# Patient Record
Sex: FEMALE | Race: BLACK OR AFRICAN AMERICAN | NOT HISPANIC OR LATINO | Employment: OTHER | URBAN - METROPOLITAN AREA
[De-identification: names, ages, dates, MRNs, and addresses within clinical notes are randomized per-mention and may not be internally consistent; named-entity substitution may affect disease eponyms.]

---

## 2021-03-11 ENCOUNTER — IMMUNIZATION (OUTPATIENT)
Dept: NURSING | Facility: CLINIC | Age: 46
End: 2021-03-11
Payer: COMMERCIAL

## 2021-03-11 PROCEDURE — 0001A PR COVID VAC PFIZER DIL RECON 30 MCG/0.3 ML IM: CPT

## 2021-03-11 PROCEDURE — 91300 PR COVID VAC PFIZER DIL RECON 30 MCG/0.3 ML IM: CPT

## 2021-03-21 ENCOUNTER — HEALTH MAINTENANCE LETTER (OUTPATIENT)
Age: 46
End: 2021-03-21

## 2021-04-01 ENCOUNTER — IMMUNIZATION (OUTPATIENT)
Dept: NURSING | Facility: CLINIC | Age: 46
End: 2021-04-01
Attending: FAMILY MEDICINE
Payer: COMMERCIAL

## 2021-04-01 PROCEDURE — 0002A PR COVID VAC PFIZER DIL RECON 30 MCG/0.3 ML IM: CPT

## 2021-04-01 PROCEDURE — 91300 PR COVID VAC PFIZER DIL RECON 30 MCG/0.3 ML IM: CPT

## 2021-09-04 ENCOUNTER — HEALTH MAINTENANCE LETTER (OUTPATIENT)
Age: 46
End: 2021-09-04

## 2021-12-10 ENCOUNTER — IMMUNIZATION (OUTPATIENT)
Dept: NURSING | Facility: CLINIC | Age: 46
End: 2021-12-10
Payer: COMMERCIAL

## 2021-12-10 PROCEDURE — 91300 PR COVID VAC PFIZER DIL RECON 30 MCG/0.3 ML IM: CPT

## 2021-12-10 PROCEDURE — 0004A PR COVID VAC PFIZER DIL RECON 30 MCG/0.3 ML IM: CPT

## 2022-04-16 ENCOUNTER — HEALTH MAINTENANCE LETTER (OUTPATIENT)
Age: 47
End: 2022-04-16

## 2022-05-24 ENCOUNTER — E-VISIT (OUTPATIENT)
Dept: URGENT CARE | Facility: CLINIC | Age: 47
End: 2022-05-24
Payer: COMMERCIAL

## 2022-05-24 DIAGNOSIS — R06.02 SOB (SHORTNESS OF BREATH): Primary | ICD-10-CM

## 2022-05-24 PROCEDURE — 99207 PR NON-BILLABLE SERV PER CHARTING: CPT | Performed by: PHYSICIAN ASSISTANT

## 2022-05-25 NOTE — PATIENT INSTRUCTIONS
Dear Esperanza Hernandez,    We are sorry you are not feeling well. Based on the responses you provided, it is recommended that you be seen in-person in urgent care so we can better evaluate your symptoms. Please click here to find the nearest urgent care location to you.   You will not be charged for this Visit. Thank you for trusting us with your care.    Hoang Pradhan PA-C

## 2022-10-22 ENCOUNTER — HEALTH MAINTENANCE LETTER (OUTPATIENT)
Age: 47
End: 2022-10-22

## 2023-06-01 ENCOUNTER — HEALTH MAINTENANCE LETTER (OUTPATIENT)
Age: 48
End: 2023-06-01

## 2024-06-02 ENCOUNTER — HEALTH MAINTENANCE LETTER (OUTPATIENT)
Age: 49
End: 2024-06-02

## 2024-12-20 ENCOUNTER — TRANSFERRED RECORDS (OUTPATIENT)
Dept: MULTI SPECIALTY CLINIC | Facility: CLINIC | Age: 49
End: 2024-12-20

## 2024-12-20 LAB
CHOLESTEROL (EXTERNAL): 149 MG/DL (ref 50–199)
HDLC SERPL-MCNC: 48 MG/DL
LDL CHOLESTEROL CALCULATED (EXTERNAL): 90 MG/DL
TRIGLYCERIDES (EXTERNAL): 55 MG/DL (ref 10–150)

## 2025-01-17 ENCOUNTER — TRANSFERRED RECORDS (OUTPATIENT)
Dept: MULTI SPECIALTY CLINIC | Facility: CLINIC | Age: 50
End: 2025-01-17

## 2025-01-17 LAB — RETINOPATHY: NORMAL

## 2025-04-07 PROBLEM — R10.13 EPIGASTRIC PAIN: Status: RESOLVED | Noted: 2018-09-04 | Resolved: 2025-04-07

## 2025-04-07 PROBLEM — I10 ESSENTIAL HYPERTENSION: Status: ACTIVE | Noted: 2021-02-24

## 2025-04-07 PROBLEM — E55.9 VITAMIN D DEFICIENCY: Status: ACTIVE | Noted: 2021-02-24

## 2025-04-07 PROBLEM — G47.30 MILD SLEEP APNEA: Status: ACTIVE | Noted: 2021-02-24

## 2025-04-07 PROBLEM — Z78.9 USE OF CONDOMS FOR CONTRACEPTION: Status: RESOLVED | Noted: 2021-03-05 | Resolved: 2025-04-07

## 2025-04-07 PROBLEM — E66.01 MORBID OBESITY (H): Status: RESOLVED | Noted: 2018-09-04 | Resolved: 2025-04-07

## 2025-04-07 PROBLEM — E11.9 DIABETES MELLITUS, TYPE II (H): Status: ACTIVE | Noted: 2018-09-04

## 2025-04-07 RX ORDER — LISINOPRIL 5 MG/1
5 TABLET ORAL
COMMUNITY
Start: 2021-11-11 | End: 2025-04-08 | Stop reason: ALTCHOICE

## 2025-04-08 ENCOUNTER — OFFICE VISIT (OUTPATIENT)
Dept: INTERNAL MEDICINE | Facility: CLINIC | Age: 50
End: 2025-04-08
Payer: COMMERCIAL

## 2025-04-08 ENCOUNTER — ORDERS ONLY (AUTO-RELEASED) (OUTPATIENT)
Dept: INTERNAL MEDICINE | Facility: CLINIC | Age: 50
End: 2025-04-08

## 2025-04-08 VITALS
BODY MASS INDEX: 44.47 KG/M2 | SYSTOLIC BLOOD PRESSURE: 128 MMHG | WEIGHT: 251 LBS | OXYGEN SATURATION: 99 % | TEMPERATURE: 97.3 F | DIASTOLIC BLOOD PRESSURE: 82 MMHG | HEIGHT: 63 IN | HEART RATE: 73 BPM | RESPIRATION RATE: 20 BRPM

## 2025-04-08 DIAGNOSIS — N92.0 MENORRHAGIA WITH REGULAR CYCLE: ICD-10-CM

## 2025-04-08 DIAGNOSIS — Z12.11 SCREEN FOR COLON CANCER: ICD-10-CM

## 2025-04-08 DIAGNOSIS — Z86.2 HISTORY OF ANEMIA: ICD-10-CM

## 2025-04-08 DIAGNOSIS — E04.9 ENLARGED THYROID: ICD-10-CM

## 2025-04-08 DIAGNOSIS — Z87.19 HISTORY OF CHOLELITHIASIS: ICD-10-CM

## 2025-04-08 DIAGNOSIS — Z12.31 VISIT FOR SCREENING MAMMOGRAM: ICD-10-CM

## 2025-04-08 DIAGNOSIS — I10 ESSENTIAL HYPERTENSION: ICD-10-CM

## 2025-04-08 DIAGNOSIS — Z13.220 SCREENING CHOLESTEROL LEVEL: ICD-10-CM

## 2025-04-08 DIAGNOSIS — R10.11 RUQ ABDOMINAL PAIN: ICD-10-CM

## 2025-04-08 DIAGNOSIS — R10.13 EPIGASTRIC PAIN: ICD-10-CM

## 2025-04-08 DIAGNOSIS — Z12.4 CERVICAL CANCER SCREENING: ICD-10-CM

## 2025-04-08 DIAGNOSIS — Z86.19 HISTORY OF HELICOBACTER PYLORI INFECTION: ICD-10-CM

## 2025-04-08 DIAGNOSIS — Z00.00 ANNUAL PHYSICAL EXAM: Primary | ICD-10-CM

## 2025-04-08 DIAGNOSIS — Z11.59 NEED FOR HEPATITIS C SCREENING TEST: ICD-10-CM

## 2025-04-08 DIAGNOSIS — E55.9 VITAMIN D DEFICIENCY: ICD-10-CM

## 2025-04-08 DIAGNOSIS — E11.9 TYPE 2 DIABETES MELLITUS WITHOUT COMPLICATION, WITHOUT LONG-TERM CURRENT USE OF INSULIN (H): ICD-10-CM

## 2025-04-08 PROBLEM — E11.69 TYPE 2 DIABETES MELLITUS WITH OTHER SPECIFIED COMPLICATION, WITHOUT LONG-TERM CURRENT USE OF INSULIN (H): Status: ACTIVE | Noted: 2018-09-04

## 2025-04-08 PROBLEM — D50.0 IRON DEFICIENCY ANEMIA DUE TO CHRONIC BLOOD LOSS: Status: ACTIVE | Noted: 2022-03-03

## 2025-04-08 PROCEDURE — 90656 IIV3 VACC NO PRSV 0.5 ML IM: CPT | Performed by: NURSE PRACTITIONER

## 2025-04-08 PROCEDURE — 90677 PCV20 VACCINE IM: CPT | Performed by: NURSE PRACTITIONER

## 2025-04-08 PROCEDURE — 90746 HEPB VACCINE 3 DOSE ADULT IM: CPT | Performed by: NURSE PRACTITIONER

## 2025-04-08 PROCEDURE — 90715 TDAP VACCINE 7 YRS/> IM: CPT | Performed by: NURSE PRACTITIONER

## 2025-04-08 PROCEDURE — 99214 OFFICE O/P EST MOD 30 MIN: CPT | Mod: 25 | Performed by: NURSE PRACTITIONER

## 2025-04-08 PROCEDURE — 3074F SYST BP LT 130 MM HG: CPT | Performed by: NURSE PRACTITIONER

## 2025-04-08 PROCEDURE — 90471 IMMUNIZATION ADMIN: CPT | Performed by: NURSE PRACTITIONER

## 2025-04-08 PROCEDURE — 99386 PREV VISIT NEW AGE 40-64: CPT | Mod: 25 | Performed by: NURSE PRACTITIONER

## 2025-04-08 PROCEDURE — G2211 COMPLEX E/M VISIT ADD ON: HCPCS | Performed by: NURSE PRACTITIONER

## 2025-04-08 PROCEDURE — 90472 IMMUNIZATION ADMIN EACH ADD: CPT | Performed by: NURSE PRACTITIONER

## 2025-04-08 PROCEDURE — 3079F DIAST BP 80-89 MM HG: CPT | Performed by: NURSE PRACTITIONER

## 2025-04-08 RX ORDER — FUROSEMIDE 20 MG/1
1 TABLET ORAL DAILY
COMMUNITY
End: 2025-04-08

## 2025-04-08 RX ORDER — ACYCLOVIR 400 MG/1
TABLET ORAL
COMMUNITY
Start: 2025-03-04

## 2025-04-08 RX ORDER — LOSARTAN POTASSIUM 50 MG/1
50 TABLET ORAL
COMMUNITY
End: 2025-04-08

## 2025-04-08 RX ORDER — LOSARTAN POTASSIUM 25 MG/1
TABLET ORAL
COMMUNITY
Start: 2025-04-05

## 2025-04-08 RX ORDER — LOSARTAN POTASSIUM 50 MG/1
50 TABLET ORAL DAILY
Qty: 90 TABLET | Refills: 1 | Status: SHIPPED | OUTPATIENT
Start: 2025-04-08

## 2025-04-08 RX ORDER — TIRZEPATIDE 5 MG/.5ML
INJECTION, SOLUTION SUBCUTANEOUS
COMMUNITY

## 2025-04-08 RX ORDER — COPPER 313.4 MG/1
1 INTRAUTERINE DEVICE INTRAUTERINE ONCE
COMMUNITY

## 2025-04-08 SDOH — HEALTH STABILITY: PHYSICAL HEALTH: ON AVERAGE, HOW MANY MINUTES DO YOU ENGAGE IN EXERCISE AT THIS LEVEL?: 20 MIN

## 2025-04-08 SDOH — HEALTH STABILITY: PHYSICAL HEALTH: ON AVERAGE, HOW MANY DAYS PER WEEK DO YOU ENGAGE IN MODERATE TO STRENUOUS EXERCISE (LIKE A BRISK WALK)?: 2 DAYS

## 2025-04-08 ASSESSMENT — SOCIAL DETERMINANTS OF HEALTH (SDOH): HOW OFTEN DO YOU GET TOGETHER WITH FRIENDS OR RELATIVES?: TWICE A WEEK

## 2025-04-08 NOTE — PROGRESS NOTES
{PROVIDER CHARTING PREFERENCE:097170}    Subjective   Esperanza Hernandez is a 49 year old, presenting for the following health issues:  No chief complaint on file.      Via the Health Maintenance questionnaire, the patient has reported the following services have been completed -Eye Exam: [facility] 2025-[facility], this information has been sent to the abstraction team.  HPI        {SUPERLIST (Optional):698356}  {additonal problems for provider to add (Optional):363671}    {ROS Picklists (Optional):382573}      Objective    There were no vitals taken for this visit.  There is no height or weight on file to calculate BMI.  Physical Exam   {Exam List (Optional):830151}    {Diagnostic Test Results (Optional):937004}        Signed Electronically by: AMINTA Yoon CNP  {Email feedback regarding this note to [email or web address]   :[id number]}

## 2025-04-08 NOTE — Clinical Note
Not sure if any of this can be updated from patient report but -- Diabetic eye exam: 12/24/2024 at [facility]. Last labs from endocrinology 12/20/24 -- A1c 5.3%; Total cholesterol 149; triglycerides 55; HDL 48; LDL 90  Thanks

## 2025-04-08 NOTE — PATIENT INSTRUCTIONS
[facility] colon cancer screening test ordered.  Complete the test by following the instructions. Cologard has a 24/7 help number ([phone]) that you can to walk you through the collection steps if you feel confused or overwhelmed when trying to collect the sample.  Return the kit via [employer]. You can arrange to have UPS pick it up from your doorstep if you prefer.    Order is good for 1 year. Call [phone] if you need a new kit in the next year; insurance is only charged when the sample is received, not each time you request a new kit.    If result is negative, plan to repeat testing in 3 years.  If result positive, will refer you to gastroenterology for follow up evaluation.    Comparison of colon cancer screening methods:  FIT: Accuracy detecting colon cancer 74%. False positive rate 5%.   Cologard: Accuracy detecting colon cancer 92%. False positive rate 13%.   Colonoscopy: Accuracy rate up to 99% (gold standard for colon cancer screening)    Ultimately, the best screening method for you is the one that you do!      Patient Education   Preventive Care Advice   This is general advice given by our system to help you stay healthy. However, your care team may have specific advice just for you. Please talk to your care team about your preventive care needs.  Nutrition  Eat 5 or more servings of fruits and vegetables each day.  Try wheat bread, brown rice and whole grain pasta (instead of white bread, rice, and pasta).  Get enough calcium and vitamin D. Check the label on foods and aim for 100% of the RDA (recommended daily allowance).  Lifestyle  Exercise at least 150 minutes each week  (30 minutes a day, 5 days a week).  Do muscle strengthening activities 2 days a week. These help control your weight and prevent disease.  No smoking.  Wear sunscreen to prevent skin cancer.  Have a dental exam and cleaning every 6 months.  Yearly exams  See your health care team every year to talk about:  Any changes in your  health.  Any medicines your care team has prescribed.  Preventive care, family planning, and ways to prevent chronic diseases.  Shots (vaccines)   HPV shots (up to age 26), if you've never had them before.  Hepatitis B shots (up to age 59), if you've never had them before.  COVID-19 shot: Get this shot when it's due.  Flu shot: Get a flu shot every year.  Tetanus shot: Get a tetanus shot every 10 years.  Pneumococcal, hepatitis A, and RSV shots: Ask your care team if you need these based on your risk.  Shingles shot (for age 50 and up)  General health tests  Diabetes screening:  Starting at age 35, Get screened for diabetes at least every 3 years.  If you are younger than age 35, ask your care team if you should be screened for diabetes.  Cholesterol test: At age 39, start having a cholesterol test every 5 years, or more often if advised.  Bone density scan (DEXA): At age 50, ask your care team if you should have this scan for osteoporosis (brittle bones).  Hepatitis C: Get tested at least once in your life.  STIs (sexually transmitted infections)  Before age 24: Ask your care team if you should be screened for STIs.  After age 24: Get screened for STIs if you're at risk. You are at risk for STIs (including HIV) if:  You are sexually active with more than one person.  You don't use condoms every time.  You or a partner was diagnosed with a sexually transmitted infection.  If you are at risk for HIV, ask about PrEP medicine to prevent HIV.  Get tested for HIV at least once in your life, whether you are at risk for HIV or not.  Cancer screening tests  Cervical cancer screening: If you have a cervix, begin getting regular cervical cancer screening tests starting at age 21.  Breast cancer scan (mammogram): If you've ever had breasts, begin having regular mammograms starting at age 40. This is a scan to check for breast cancer.  Colon cancer screening: It is important to start screening for colon cancer at age 45.  Have  a colonoscopy test every 10 years (or more often if you're at risk) Or, ask your provider about stool tests like a FIT test every year or Cologuard test every 3 years.  To learn more about your testing options, visit:   .  For help making a decision, visit:   https://bit.tio/ap06106.  Prostate cancer screening test: If you have a prostate, ask your care team if a prostate cancer screening test (PSA) at age 55 is right for you.  Lung cancer screening: If you are a current or former smoker ages 50 to 80, ask your care team if ongoing lung cancer screenings are right for you.  For informational purposes only. Not to replace the advice of your health care provider. Copyright   2023 [facility]. All rights reserved. Clinically reviewed by the [facility]. [employer] [id number] - REV 01/24.

## 2025-04-08 NOTE — PROGRESS NOTES
Preventive Care Visit  [facility]  AMINTA Yoon CNP, Internal Medicine  Apr 8, 2025    Assessment & Plan     (Z00.00) Annual physical exam  (primary encounter diagnosis)  Comment: Past medical history, surgical history, family history, social history, sexual history, medications, allergies, and immunizations reviewed and updated as appropriate.   Care gaps addressed, including routine screenings.  Annual lab work ordered.  Physical exam unremarkable, except as noted.  Diet/exercise recommendations discussed.  Plan: Hepatic function panel    (Z12.4) Cervical cancer screening  Comment: Deferred Pap today due to menstruation.  She will schedule a follow-up visit for this.    (E11.9) Type 2 diabetes mellitus without complication, without long-term current use of insulin (H)  Comment: Stable on current medications.  Followed by endocrinology.  Diabetic foot and eye exams were updated December 2024.    (I10) Essential hypertension  Comment: Stable on current medications.  Medication refill provided.  Labs ordered for monitoring.  Plan: Albumin Random Urine Quantitative with Creat         Ratio, losartan (COZAAR) 50 MG tablet    (E55.9) Vitamin D deficiency  Comment: Labs ordered for monitoring.  She is not currently taking a vitamin D supplement.  Plan: Vitamin D Deficiency    (N92.0) Menorrhagia with regular cycle  (Z86.2) History of anemia  Comment: Labs ordered for monitoring.  Plan to try oral iron supplements again if labs indicate need.  Intolerance previously occurred in the setting of pregnancy.   Plan: CBC with platelets, Ferritin, Iron & Iron         Binding Capacity    (R10.11) RUQ abdominal pain  (R10.13) Epigastric pain  (Z86.19) History of Helicobacter pylori infection  (Z87.19) History of cholelithiasis  Comment: Test for H. pylori, given anemia and history of H. pylori infection.  History of cholelithiasis; ultrasound ordered for monitoring.  Discussed need to hold  "Prevacid for 2 weeks prior to H. pylori testing for accurate results, but okay to take afterward to continue managing symptoms as needed.  Plan: US Abdomen Limited,           Helicobacter pylori Antigen Stool    (E04.9) Enlarged thyroid  Comment: Thyroid labs have been normal, per review of endocrinology lab results.  Thyroid is enlarged on physical exam; patient reports she has been concerned about this.    Has never had thyroid ultrasound to evaluate further.  Ultrasound was ordered.  Plan: US Thyroid    (Z13.220) Screening cholesterol level  Comment: At goal, per review of endocrinology records.  She is not on cholesterol-lowering medication.    (Z12.31) Visit for screening mammogram  Comment: Mammogram ordered.  Denies history of abnormal or family history of breast cancer.  Plan: MA Screening Bilateral w/ [facility]    (Z12.11) Screen for colon cancer  Comment: Has never had colon cancer screening.  Informed consent completed.  She would like to proceed with Cologuard.  Plan: COLOGUARD([email or web address])    (Z11.59) Need for hepatitis C screening test  Comment: Labs ordered for screening.  Plan: Hepatitis C Screen Reflex to HCV RNA Quant and         Genotype     Patient has been advised of split billing requirements and indicates understanding: Yes    BMI  Estimated body mass index is 44.11 kg/m  as calculated from the following:    Height as of this encounter: 1.607 m (5' 3.25\").    Weight as of this encounter: 113.9 kg (251 lb).   Weight management plan: Discussed healthy diet and exercise guidelines ; she will schedule a follow-up visit to discuss weight management more specifically.    Counseling  Appropriate preventive services were addressed with this patient via screening, questionnaire, or discussion as appropriate for fall prevention, nutrition, physical activity, Tobacco-use cessation, social engagement, weight loss and cognition.  Checklist reviewing preventive services available has been given to the " patient.  Reviewed patient's diet, addressing concerns and/or questions.   She is at risk for lack of exercise and has been provided with information to increase physical activity for the benefit of her well-being.     See Patient Instructions    The longitudinal plan of care for the diagnosis(es)/condition(s) as documented were addressed during this visit. Due to the added complexity in care, I will continue to support Esperanza Hernandez in the subsequent management and with ongoing continuity of care.        Subjective   Esperanza Hernandez is a 49 year old, presenting for the following:  Physical    Here for annual exam and to establish care. It has been a couple years since her last physical.  Works in home care.    The following concerns were also addressed today:  Type 2 diabetes: Diagnosed in 2010; had gestational diabetes prior to that. Managed by endocrinology. Uses Dexcom G7; blood sugar has been well-controlled, per her report. Taking Mounjaro 5 mg weekly. 12/20/2024 5.3% A1c.  HTN: Taking losartan 50 mg daily. Lisinopril previously caused cough. Home BP 110s-120s/80s.  Epigastric pain: History of H pylori in 2010; treated at that time. History of cholelithiasis; managed by diet. Takes Prevacid at least once weekly.  Menorrhagia: Worsened since copper IUD was placed. Has been anemic secondary to menorrhagia. Gained weight on hormonal conctraceptives; does not wish to pursue surgery/hysterectomy. Has tried oral iron supplements in the past and they caused significant nausea and emesis; this was in 2014 when she was pregnant. Willing to try oral supplements again if labs indicate need.    Health Maintenance:  Vaccines due: influenza, COVID-19, pneumococcal, Tdap/Td, and Hepatitis B  Mammogram: Mammogram Screening: Recommended annual mammography. Family history of breast cancer?: no  Pap: Menstruating heavily; will schedule an appointment when not menstruating. Last: Overdue since 9/4/2021; OVERDUE for follow-up. History  of abnormal?: no. Colposcopy: no. LEEP: no.   Family history of uterine cancer: no; ovarian cancer: no.  Colon cancer screening: Due. Family history of colon cancer?: no  DEXA: N/A.  Screening chest CT: N/A. Family history of lung cancer?: no  AAA screen: N/A  Vision: No concerns. Wears glasses. Diabetic eye exam 12/24/2024. [facility].  Dental: No concerns.   Hearing: No concerns.     Sexual Health History:  Partners:Male  Contraception: Parguard IUD  Menstrual history: regular and menorrhagia  Perimenopausal symptoms?: menorrhagia           4/8/2025   General Health   How would you rate your overall physical health? (!) FAIR   Feel stress (tense, anxious, or unable to sleep) Not at all         4/8/2025   Nutrition   Three or more servings of calcium each day? Yes   Diet: Breakfast skipped   How many servings of fruit and vegetables per day? 4 or more   How many sweetened beverages each day? (!) 2         4/8/2025   Exercise   Days per week of moderate/strenous exercise 2 days   Average minutes spent exercising at this level 20 min   (!) EXERCISE CONCERN      4/8/2025   Social Factors   Frequency of gathering with friends or relatives Twice a week   Worry food won't last until get money to buy more No   Food not last or not have enough money for food? No   Do you have housing? (Housing is defined as stable permanent housing and does not include staying ouside in a car, in a tent, in an abandoned building, in an overnight shelter, or couch-surfing.) No   Are you worried about losing your housing? No   Lack of transportation? No   Unable to get utilities (heat,electricity)? No   Want help with housing or utility concern? No   (!) HOUSING CONCERN PRESENT      4/8/2025   Dental   Dentist two times every year? Yes     Today's PHQ-2 Score:       4/8/2025     2:20 PM   PHQ-2 ( 1999 [employer])   Q1: Little interest or pleasure in doing things 0   Q2: Feeling down, depressed or hopeless 0   PHQ-2 Score 0    Q1:  "Little interest or pleasure in doing things Not at all   Q2: Feeling down, depressed or hopeless Not at all   PHQ-2 Score 0       Patient-reported         4/8/2025   Substance Use   Alcohol more than 3/day or more than 7/wk No   Do you use any other substances recreationally? No     Social History     Tobacco Use    Smoking status: Never    Smokeless tobacco: Never   Vaping Use    Vaping status: Never Used   Substance Use Topics    Alcohol use: Not Currently    Drug use: Not Currently         4/8/2025   STI Screening   New sexual partner(s) since last STI/HIV test? No        ASCVD Risk   The ASCVD Risk score (Gerardo AREVALO, et al., 2019) failed to calculate for the following reasons:    Cannot find a previous HDL lab    Cannot find a previous total cholesterol lab        4/8/2025   Contraception/Family Planning   Questions about contraception or family planning No     Reviewed and updated as needed this visit by Provider   Tobacco     Med Hx  Surg Hx  Fam Hx  Soc Hx Sexual Activity            Review of Systems  Constitutional, HEENT, cardiovascular, pulmonary, GI, , musculoskeletal, neuro, skin, endocrine and psych systems are negative, except as otherwise noted.     Objective    Exam  /82 (BP Location: Left arm, Patient Position: Sitting, Cuff Size: Adult Large)   Pulse 73   Temp 97.3  F (36.3  C) (Temporal)   Resp 20   Ht 1.607 m (5' 3.25\")   Wt 113.9 kg (251 lb)   LMP 04/05/2025 (Approximate)   SpO2 99%   BMI 44.11 kg/m     Estimated body mass index is 44.11 kg/m  as calculated from the following:    Height as of this encounter: 1.607 m (5' 3.25\").    Weight as of this encounter: 113.9 kg (251 lb).    Physical Exam  Vitals and nursing note reviewed.   Constitutional:       Appearance: Normal appearance. She is obese.   HENT:      Head: Normocephalic and atraumatic.      Right Ear: Tympanic membrane, ear canal and external ear normal.      Left Ear: Tympanic membrane, ear canal and " external ear normal.      Nose: Nose normal.      Mouth/Throat:      Pharynx: Oropharynx is clear.   Eyes:      Extraocular Movements: Extraocular movements intact.      Conjunctiva/sclera: Conjunctivae normal.      Pupils: Pupils are equal, round, and reactive to light.   Neck:      Thyroid: Thyromegaly (R>L) present. No thyroid tenderness.   Cardiovascular:      Rate and Rhythm: Normal rate and regular rhythm.      Pulses: Normal pulses.      Heart sounds: Normal heart sounds. No murmur heard.     No friction rub. No gallop.   Pulmonary:      Effort: Pulmonary effort is normal. No respiratory distress.      Breath sounds: Normal breath sounds. No wheezing, rhonchi or rales.   Abdominal:      General: Bowel sounds are decreased. There is no distension.      Palpations: Abdomen is soft. There is no mass.      Tenderness: There is no abdominal tenderness. There is no guarding.      Hernia: No hernia is present.   Musculoskeletal:         General: No swelling. Normal range of motion.      Cervical back: Normal range of motion and neck supple.   Lymphadenopathy:      Cervical: No cervical adenopathy.   Skin:     General: Skin is warm and dry.   Neurological:      General: No focal deficit present.      Mental Status: She is alert and oriented to person, place, and time.   Psychiatric:         Mood and Affect: Mood normal.         Behavior: Behavior normal.         Thought Content: Thought content normal.         Judgment: Judgment normal.       Signed Electronically by: AMINTA Yoon CNP

## 2025-04-09 ENCOUNTER — LAB (OUTPATIENT)
Dept: LAB | Facility: CLINIC | Age: 50
End: 2025-04-09
Payer: COMMERCIAL

## 2025-04-09 ENCOUNTER — PATIENT OUTREACH (OUTPATIENT)
Dept: CARE COORDINATION | Facility: CLINIC | Age: 50
End: 2025-04-09

## 2025-04-09 DIAGNOSIS — I10 ESSENTIAL HYPERTENSION: Primary | ICD-10-CM

## 2025-04-09 DIAGNOSIS — E11.69 TYPE 2 DIABETES MELLITUS WITH OTHER SPECIFIED COMPLICATION, WITHOUT LONG-TERM CURRENT USE OF INSULIN (H): ICD-10-CM

## 2025-04-12 ENCOUNTER — HEALTH MAINTENANCE LETTER (OUTPATIENT)
Age: 50
End: 2025-04-12

## 2025-04-17 ENCOUNTER — ANCILLARY PROCEDURE (OUTPATIENT)
Dept: ULTRASOUND IMAGING | Facility: CLINIC | Age: 50
End: 2025-04-17
Attending: NURSE PRACTITIONER
Payer: COMMERCIAL

## 2025-04-17 ENCOUNTER — LAB (OUTPATIENT)
Dept: LAB | Facility: CLINIC | Age: 50
End: 2025-04-17
Payer: COMMERCIAL

## 2025-04-17 DIAGNOSIS — R10.11 RUQ ABDOMINAL PAIN: ICD-10-CM

## 2025-04-17 DIAGNOSIS — E04.9 ENLARGED THYROID: ICD-10-CM

## 2025-04-17 DIAGNOSIS — Z87.19 HISTORY OF CHOLELITHIASIS: ICD-10-CM

## 2025-04-17 DIAGNOSIS — E11.69 TYPE 2 DIABETES MELLITUS WITH OTHER SPECIFIED COMPLICATION, WITHOUT LONG-TERM CURRENT USE OF INSULIN (H): ICD-10-CM

## 2025-04-17 DIAGNOSIS — E55.9 VITAMIN D DEFICIENCY: ICD-10-CM

## 2025-04-17 DIAGNOSIS — Z86.2 HISTORY OF ANEMIA: ICD-10-CM

## 2025-04-17 DIAGNOSIS — Z00.00 ANNUAL PHYSICAL EXAM: ICD-10-CM

## 2025-04-17 DIAGNOSIS — Z11.59 NEED FOR HEPATITIS C SCREENING TEST: ICD-10-CM

## 2025-04-17 DIAGNOSIS — R10.13 EPIGASTRIC PAIN: ICD-10-CM

## 2025-04-17 DIAGNOSIS — N92.0 MENORRHAGIA WITH REGULAR CYCLE: ICD-10-CM

## 2025-04-17 DIAGNOSIS — I10 ESSENTIAL HYPERTENSION: ICD-10-CM

## 2025-04-17 DIAGNOSIS — Z86.19 HISTORY OF HELICOBACTER PYLORI INFECTION: ICD-10-CM

## 2025-04-17 LAB
ALBUMIN SERPL BCG-MCNC: 3.9 G/DL (ref 3.5–5.2)
ALP SERPL-CCNC: 53 U/L (ref 40–150)
ALT SERPL W P-5'-P-CCNC: 12 U/L (ref 0–50)
ANION GAP SERPL CALCULATED.3IONS-SCNC: 10 MMOL/L (ref 7–15)
AST SERPL W P-5'-P-CCNC: 20 U/L (ref 0–45)
BILIRUB DIRECT SERPL-MCNC: <0.08 MG/DL (ref 0–0.3)
BILIRUB SERPL-MCNC: 0.2 MG/DL
BUN SERPL-MCNC: 14.3 MG/DL (ref 6–20)
CALCIUM SERPL-MCNC: 8.8 MG/DL (ref 8.8–10.4)
CHLORIDE SERPL-SCNC: 106 MMOL/L (ref 98–107)
CREAT SERPL-MCNC: 0.61 MG/DL (ref 0.51–0.95)
EGFRCR SERPLBLD CKD-EPI 2021: >90 ML/MIN/1.73M2
ERYTHROCYTE [DISTWIDTH] IN BLOOD BY AUTOMATED COUNT: 14.1 % (ref 10–15)
EST. AVERAGE GLUCOSE BLD GHB EST-MCNC: 117 MG/DL
FERRITIN SERPL-MCNC: 24 NG/ML (ref 6–175)
GLUCOSE SERPL-MCNC: 106 MG/DL (ref 70–99)
HBA1C MFR BLD: 5.7 % (ref 0–5.6)
HCO3 SERPL-SCNC: 23 MMOL/L (ref 22–29)
HCT VFR BLD AUTO: 35.3 % (ref 35–47)
HCV AB SERPL QL IA: NONREACTIVE
HGB BLD-MCNC: 11 G/DL (ref 11.7–15.7)
IRON BINDING CAPACITY (ROCHE): 255 UG/DL (ref 240–430)
IRON SATN MFR SERPL: 11 % (ref 15–46)
IRON SERPL-MCNC: 29 UG/DL (ref 37–145)
MCH RBC QN AUTO: 25.8 PG (ref 26.5–33)
MCHC RBC AUTO-ENTMCNC: 31.2 G/DL (ref 31.5–36.5)
MCV RBC AUTO: 83 FL (ref 78–100)
PLATELET # BLD AUTO: 288 10E3/UL (ref 150–450)
POTASSIUM SERPL-SCNC: 4.4 MMOL/L (ref 3.4–5.3)
PROT SERPL-MCNC: 7.5 G/DL (ref 6.4–8.3)
RBC # BLD AUTO: 4.27 10E6/UL (ref 3.8–5.2)
SODIUM SERPL-SCNC: 139 MMOL/L (ref 135–145)
VIT D+METAB SERPL-MCNC: 30 NG/ML (ref 20–50)
WBC # BLD AUTO: 9.2 10E3/UL (ref 4–11)

## 2025-04-18 LAB
CREAT UR-MCNC: 113 MG/DL
MICROALBUMIN UR-MCNC: 27.1 MG/L
MICROALBUMIN/CREAT UR: 23.98 MG/G CR (ref 0–25)

## 2025-04-23 ENCOUNTER — OFFICE VISIT (OUTPATIENT)
Dept: INTERNAL MEDICINE | Facility: CLINIC | Age: 50
End: 2025-04-23
Payer: COMMERCIAL

## 2025-04-23 VITALS
HEART RATE: 69 BPM | WEIGHT: 252.7 LBS | SYSTOLIC BLOOD PRESSURE: 140 MMHG | DIASTOLIC BLOOD PRESSURE: 88 MMHG | HEIGHT: 63 IN | TEMPERATURE: 97.3 F | OXYGEN SATURATION: 99 % | RESPIRATION RATE: 18 BRPM | BODY MASS INDEX: 44.77 KG/M2

## 2025-04-23 DIAGNOSIS — A04.8 HELICOBACTER PYLORI (H. PYLORI) INFECTION: ICD-10-CM

## 2025-04-23 DIAGNOSIS — E11.9 TYPE 2 DIABETES MELLITUS WITHOUT COMPLICATION, WITHOUT LONG-TERM CURRENT USE OF INSULIN (H): ICD-10-CM

## 2025-04-23 DIAGNOSIS — E04.1 THYROID NODULE: ICD-10-CM

## 2025-04-23 DIAGNOSIS — Z12.4 CERVICAL CANCER SCREENING: ICD-10-CM

## 2025-04-23 DIAGNOSIS — E66.813 OBESITY, CLASS III, BMI 40-49.9 (MORBID OBESITY) (H): Primary | ICD-10-CM

## 2025-04-23 DIAGNOSIS — K80.20 CALCULUS OF GALLBLADDER WITHOUT CHOLECYSTITIS WITHOUT OBSTRUCTION: ICD-10-CM

## 2025-04-23 DIAGNOSIS — N81.89 OTHER FEMALE GENITAL PROLAPSE: ICD-10-CM

## 2025-04-23 PROCEDURE — 3077F SYST BP >= 140 MM HG: CPT | Performed by: NURSE PRACTITIONER

## 2025-04-23 PROCEDURE — 99214 OFFICE O/P EST MOD 30 MIN: CPT | Performed by: NURSE PRACTITIONER

## 2025-04-23 PROCEDURE — 87624 HPV HI-RISK TYP POOLED RSLT: CPT | Performed by: NURSE PRACTITIONER

## 2025-04-23 PROCEDURE — 3079F DIAST BP 80-89 MM HG: CPT | Performed by: NURSE PRACTITIONER

## 2025-04-23 PROCEDURE — G2211 COMPLEX E/M VISIT ADD ON: HCPCS | Performed by: NURSE PRACTITIONER

## 2025-04-23 RX ORDER — PHENTERMINE HYDROCHLORIDE 37.5 MG/1
37.5 TABLET ORAL
Qty: 90 TABLET | Refills: 0 | Status: SHIPPED | OUTPATIENT
Start: 2025-04-23

## 2025-04-23 RX ORDER — ROSUVASTATIN CALCIUM 5 MG/1
5 TABLET, COATED ORAL DAILY
Qty: 90 TABLET | Refills: 1 | Status: SHIPPED | OUTPATIENT
Start: 2025-04-23

## 2025-04-23 RX ORDER — TIRZEPATIDE 5 MG/.5ML
5 INJECTION, SOLUTION SUBCUTANEOUS WEEKLY
Qty: 6 ML | Refills: 0 | Status: SHIPPED | OUTPATIENT
Start: 2025-04-23

## 2025-04-23 NOTE — PROGRESS NOTES
Assessment & Plan     (E66.813) Obesity, Class III, BMI 40-49.9 (morbid obesity)  (primary encounter diagnosis)  Comment: Reviewed principles of energy metabolism, caloric intake and expenditure, and rationale for treatment program.   Discussed the importance of a heart-healthy diet and regular physical activity with elevated heart rate.  Reviewed the importance of regular food intake to maintain metabolism, portion sizes, and minimizing frequency of higher-calorie, less nutrient-dense foods, including beverages and alcohol.  Discussed that goals of intervention are health (i.e. good control of comorbidities and normalization of lab values), comfort in one's own body, and ability to participate in intended activities, rather than a particular number on the scale.  Discussed medical interventions for weight management, including medication and bariatric surgery. Patient is most interested in: phentermine  Goal weight loss: 5%-10% of current body weight (12.5-25 lbs) in the next 3 months.  Patient handouts provided from https://www.obesity.org/information-for-patients/  - How much weight do I need to lose?  - Strategies proven to help people eat healthier and exercise more  - Weight management: 10 keys to success  - Dietary supplements for weight loss  - Is weight loss surgery right for you?  - Medications for weight loss  - Working with your primary care provider to manage your weight  - Body weight and cancer risk  - Help your child have a health weight   (Reports her son is struggling with obesity; she will do the same interventions that are recommended for him)  Plan: phentermine (ADIPEX-P) 37.5 MG tablet,         rosuvastatin (CRESTOR) 5 MG tablet    (E11.9) Type 2 diabetes mellitus without complication, without long-term current use of insulin (H)  Comment: Stable on current medications; followed by endocrinology.  Bridge prescription of Mounjaro sent to [facility] pharmacy to help prevent further gaps in  refills.  Informed consent completed for initiating statin therapy; she would like to proceed with low-dose Crestor.  Plan: MOUNJARO 5 MG/0.5ML SOAJ auto-injector pen,         rosuvastatin (CRESTOR) 5 MG tablet    (A04.8) Helicobacter pylori (H. pylori) infection  Comment: Taking recommended clarithromycin based therapy and tolerating.  Reviewed the importance of household members also being tested and treated to prevent reinfection.    (K80.20) Calculus of gallbladder without cholecystitis without obstruction  Comment: Declines referral to general surgery today.  Would like to focus on treating H. pylori and continuing to manage symptoms with dietary interventions.  She prefers to avoid surgery as much as possible.    (E04.1) Thyroid nodule  Comment: FNA right thyroid nodule is scheduled for 5/5/2025.    (Z12.4) Cervical cancer screening  Comment: Pap updated today.  No history of abnormal.  She does have history of pelvic organ prolapse and is noted to have uterine prolapse on exam today.  This is asymptomatic, and she declines intervention today.  Plan: HPV and Gynecologic Cytology Panel -         Recommended Age 30-65 Years     Follow-up    Follow-up Visit   Expected date:  Jul 23, 2025 (Approximate)      Follow Up Appointment Details:     Follow-up with whom?: Me    Follow-Up for what?: Other (Office Visit)    Additional Details: weight management follow-up    How?: In Person or Virtual               The longitudinal plan of care for the diagnosis(es)/condition(s) as documented were addressed during this visit. Due to the added complexity in care, I will continue to support Esperanza Hernandez in the subsequent management and with ongoing continuity of care.      Subjective   Esperanza Hernandez is a 50 year old, presenting for the following health issues:  Follow Up, Results, and Medication Problem    History of Present Illness       Diabetes:   She presents for follow up of diabetes.   She is checking home blood glucose  "with a continuous glucose monitor.   She checks blood glucose before and after meals.  Blood glucose is never over 200 and never under 70. She is aware of hypoglycemia symptoms including shakiness.   She is concerned about other.    She is not experiencing numbness or burning in feet, excessive thirst, blurry vision, weight changes or redness, sores or blisters on feet.           Reason for visit:  Follow up    She eats 2-3 servings of fruits and vegetables daily.She consumes 1 sweetened beverage(s) daily.She exercises with enough effort to increase her heart rate 9 or less minutes per day.  She exercises with enough effort to increase her heart rate 3 or less days per week.   She is taking medications regularly.        Presents for multiple concerns:  Overdue to update pap; will do today.   Pap History: Overdue since 9/4/2021. History of abnormal?: no. Colposcopy: no. LEEP: no.   Family history of uterine cancer: no; ovarian cancer: no.    Sexual Health History:  Partners:Male  Contraception: Parguard IUD  Menstrual history: regular and menorrhagia  Perimenopausal symptoms?: menorrhagia     T2DM: She is having trouble filling Mounjaro 5 mg at [facility] pharmacy. Her last injection was 4/7/25. Medication is prescribed by endocrinology. Requesting a bridge prescription to another pharmacy.  +H. Pylori: Diagnosed on recent labs. She filled the recommended prescriptions and did start them. Tolerating without adverse effects. Discussed the importance of testing and treating household members to prevent re-infection.  Cholelithiasis: Declines referral to general surgery; suspects she has a phobia about \"cuts\" and would like to avoid surgery as much as possible. She would like to focus on H. Pylori treatment for now and see if that resolves her GI symptoms.   Thyroid nodules: FNA of right thyroid nodule is scheduled for 5/5/25.  Elevated LDL >70:   The 10-year ASCVD risk score (Gerardo DK, et al., 2019) is: 10.6%    Values " "used to calculate the score:      Age: 50 years      Sex: Female      Is Non- : Yes      Diabetic: Yes      Tobacco smoker: No      Systolic Blood Pressure: 140 mmHg      Is BP treated: Yes      HDL Cholesterol: 48 mg/dL      Total Cholesterol: 149 mg/dL  Discussed risks/benefits of starting statin medication for ASCVD risk and comorbidities, especially diabetes. She would like to proceed with prescription.  Weight management:  Age at onset - \"all my life,\" worse after age 20   Rate of weight gain is described as gradual over years.   Family history positive for obesity in the patient s negative.   Patient considers ideal weight to be 180-200 lbs.   Previous treatments include  \"There's nothing I have not tried.\"   Qsymia \"made me feel dumb; I couldn't remember how to get home.\"   Is on Mounjaro for diabetes, managed by endocrinology. Dose reduced to 5 mg weekly due to excellent glycemic control and low BG in the middle of the night.  Has tried multiple diet plans.  Has worked with the weight management clinic. Is not interested in bariatric surgery.    All weight loss attempts have been effective, to varying degrees, but she has  had difficulty maintaining efforts. Often eats a large meal in the evening at around 5 pm; nothing up to that point.  Eats quite a bit of carbs.   Weight management clinic advised her to increase her caloric intake for a period of time to stimulate her metabolism, but this made her too sleepy to function.    History of eating disorders: negative  Associated medical conditions: Overweight (BMI 25.0 - 29.9), Diabetes Mellitus , Hyperlipidemia, and Gallbladder disease  Associated medications: none  Cardiovascular risk factors: lipids, diabetes mellitus, obesity, and sedentary life style    Vitals:    04/23/25 1058   Weight: 114.6 kg (252 lb 11.2 oz)      Exercise interventions: \"Walk around the house.\" Has a treadmill.      Review of Systems  Constitutional, HEENT, " "cardiovascular, pulmonary, GI, , musculoskeletal, neuro, skin, endocrine and psych systems are negative, except as otherwise noted.      Objective    BP (!) 140/88   Pulse 69   Temp 97.3  F (36.3  C) (Temporal)   Resp 18   Ht 1.607 m (5' 3.25\")   Wt 114.6 kg (252 lb 11.2 oz)   LMP 04/05/2025 (Approximate)   SpO2 99%   BMI 44.41 kg/m    Body mass index is 44.41 kg/m .  Physical Exam  Vitals and nursing note reviewed.   Constitutional:       General: She is not in acute distress.     Appearance: Normal appearance. She is obese.   Cardiovascular:      Rate and Rhythm: Normal rate and regular rhythm.      Pulses: Normal pulses.      Heart sounds: Normal heart sounds. No murmur heard.     No friction rub. No gallop.   Pulmonary:      Effort: Pulmonary effort is normal. No respiratory distress.      Breath sounds: Normal breath sounds. No wheezing, rhonchi or rales.   Abdominal:      General: Bowel sounds are normal. There is no distension.      Palpations: There is no mass.      Tenderness: There is no abdominal tenderness. There is no guarding or rebound.      Hernia: No hernia is present.   Genitourinary:     Exam position: Lithotomy position.      Pubic Area: No rash.       Labia:         Right: No rash, tenderness or lesion.         Left: No rash, tenderness or lesion.       Vagina: Prolapsed vaginal walls (uterine prolapse) present.      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   Skin:     General: Skin is warm and dry.   Neurological:      General: No focal deficit present.      Mental Status: She is alert and oriented to person, place, and time.   Psychiatric:         Mood and Affect: Mood normal.         Behavior: Behavior normal.         Thought Content: Thought content normal.         Judgment: Judgment normal.            Signed Electronically by: AMINTA Yoon CNP    "

## 2025-04-23 NOTE — PATIENT INSTRUCTIONS
Basic principles of weight management:  Elevated BMI is not a moral failing and is not a reflection of your worth as a person.  Appetite and satiety are influenced by a complex sytem of hormones and genetics.  Western culture emphasizes weight as a priority and a number on the scale. It is more helpful to think of weight as a secondary effect, with overall health and function as the goal. Questions to consider include:  Are you comfortable in your body?  Are you able to do the activities you want to do and participate in your life as fully as you would like, or does your current weight limit you?  Do you have any health conditions that could adversely effect your quality of life and/or longevity, i.e. high blood pressure, diabetes, high cholesterol, sleep apnea, joint pain, etc? If so, are these chronic conditions in good control?  Calories in vs calories out: More calories in than out generally leads to storage/gain. More calories out than in generally leads to loss. Balance of calories in/out generally leads to weight maintenance. Balancing calories in vs calories out is foundational for any weight management process, whether or not medication, surgery, etc are pursued.  A sustainable rate of weight loss is 1/2 - 1 lb per week, which is achieved by a calorie deficit of 250-500 calories per day. This can be as simple as eliminating one snack per day. Focusing on foods that are low in nutritional value and high in calories (i.e. processed foods, sweet drinks, desserts) is typically the best and easiest place to start, rather than dramatically changing what you eat for meals.  Small changes over time leads to big results in the long-term. Small changes are also are more sustainable long-term than dramatic changes that have an initial big impact. Focus on 1 or 2 things at a time that are achievable and sustainable. When those changes become so routine that they no longer feel challenging, add 1-2 new changes.  Think  about portion sizes. Eating too much of a good thing still results in calorie storage.  The NIH Body Weight Planner is a useful tool for estimating daily calorie intake for your personal goal weight in a specified time period, and maintaining your goal weight (the hardest part of weight management): Body Weight Planner - NIDDK (nih.gov)   Participating in a structured program can be helpful. Most have a subscription fee. There are many options, depending on your personal preferences and budget: [facility] Diet, Jeniffer, Sylvia Taylor, EatingWell, Weight Watchers, etc.   Free options for weight management support include ChooseMyPlate from the [place] Department of Agriculture (USDA MyPlate What Is MyPlate? ) and [employer] (Free Diet Plan and Online Weight Loss Programs at [employer]).

## 2025-04-24 LAB
HPV HR 12 DNA CVX QL NAA+PROBE: NEGATIVE
HPV16 DNA CVX QL NAA+PROBE: NEGATIVE
HPV18 DNA CVX QL NAA+PROBE: NEGATIVE
HUMAN PAPILLOMA VIRUS FINAL DIAGNOSIS: NORMAL

## 2025-04-28 LAB
BKR AP ASSOCIATED HPV REPORT: NORMAL
BKR LAB AP GYN ADEQUACY: NORMAL
BKR LAB AP GYN INTERPRETATION: NORMAL
BKR LAB AP PREVIOUS ABNORMAL: NORMAL
PATH REPORT.COMMENTS IMP SPEC: NORMAL
PATH REPORT.COMMENTS IMP SPEC: NORMAL
PATH REPORT.RELEVANT HX SPEC: NORMAL

## 2025-04-29 DIAGNOSIS — A04.8 H. PYLORI INFECTION: ICD-10-CM

## 2025-04-29 RX ORDER — OMEPRAZOLE 20 MG/1
20 CAPSULE, DELAYED RELEASE ORAL DAILY PRN
Qty: 90 CAPSULE | Refills: 0 | Status: SHIPPED | OUTPATIENT
Start: 2025-04-29

## 2025-05-13 ENCOUNTER — ANCILLARY PROCEDURE (OUTPATIENT)
Dept: ULTRASOUND IMAGING | Facility: CLINIC | Age: 50
End: 2025-05-13
Attending: NURSE PRACTITIONER
Payer: COMMERCIAL

## 2025-05-13 DIAGNOSIS — E04.1 THYROID NODULE: ICD-10-CM

## 2025-05-13 DIAGNOSIS — E04.9 ENLARGED THYROID: ICD-10-CM

## 2025-05-13 PROCEDURE — 88173 CYTOPATH EVAL FNA REPORT: CPT | Performed by: PATHOLOGY

## 2025-05-13 PROCEDURE — 10005 FNA BX W/US GDN 1ST LES: CPT | Performed by: STUDENT IN AN ORGANIZED HEALTH CARE EDUCATION/TRAINING PROGRAM

## 2025-05-14 LAB
PATH REPORT.COMMENTS IMP SPEC: NORMAL
PATH REPORT.COMMENTS IMP SPEC: NORMAL
PATH REPORT.FINAL DX SPEC: NORMAL
PATH REPORT.GROSS SPEC: NORMAL
PATH REPORT.MICROSCOPIC SPEC OTHER STN: NORMAL
PATH REPORT.RELEVANT HX SPEC: NORMAL

## 2025-06-14 ENCOUNTER — HEALTH MAINTENANCE LETTER (OUTPATIENT)
Age: 50
End: 2025-06-14

## 2025-07-26 ENCOUNTER — HEALTH MAINTENANCE LETTER (OUTPATIENT)
Age: 50
End: 2025-07-26